# Patient Record
Sex: MALE | Race: WHITE | NOT HISPANIC OR LATINO | Employment: OTHER | URBAN - METROPOLITAN AREA
[De-identification: names, ages, dates, MRNs, and addresses within clinical notes are randomized per-mention and may not be internally consistent; named-entity substitution may affect disease eponyms.]

---

## 2020-10-09 ENCOUNTER — HOSPITAL ENCOUNTER (OUTPATIENT)
Dept: OTHER | Facility: HOSPITAL | Age: 55
Discharge: HOME | End: 2020-10-09
Attending: FAMILY MEDICINE

## 2020-10-09 ENCOUNTER — CONVERSION ENCOUNTER (OUTPATIENT)
Dept: FAMILY MEDICINE CLINIC | Age: 55
End: 2020-10-09

## 2020-10-13 LAB — SARS-COV-2 RNA SPEC QL NAA+PROBE: NOT DETECTED

## 2021-07-02 VITALS — TEMPERATURE: 98.6 F

## 2021-10-10 ENCOUNTER — HOSPITAL ENCOUNTER (INPATIENT)
Dept: HOSPITAL 49 - FER | Age: 56
LOS: 2 days | Discharge: HOME | DRG: 175 | End: 2021-10-12
Attending: INTERNAL MEDICINE | Admitting: INTERNAL MEDICINE
Payer: COMMERCIAL

## 2021-10-10 VITALS — HEIGHT: 72.01 IN | BODY MASS INDEX: 19.28 KG/M2 | WEIGHT: 142.37 LBS

## 2021-10-10 DIAGNOSIS — Z20.822: ICD-10-CM

## 2021-10-10 DIAGNOSIS — I26.99: Primary | ICD-10-CM

## 2021-10-10 DIAGNOSIS — F11.10: ICD-10-CM

## 2021-10-10 DIAGNOSIS — J96.91: ICD-10-CM

## 2021-10-10 DIAGNOSIS — Z98.890: ICD-10-CM

## 2021-10-10 DIAGNOSIS — J44.9: ICD-10-CM

## 2021-10-10 DIAGNOSIS — Z86.711: ICD-10-CM

## 2021-10-10 DIAGNOSIS — A15.0: ICD-10-CM

## 2021-10-10 DIAGNOSIS — Z87.01: ICD-10-CM

## 2021-10-10 DIAGNOSIS — F15.10: ICD-10-CM

## 2021-10-10 DIAGNOSIS — F17.210: ICD-10-CM

## 2021-10-10 LAB
ALBUMIN SERPL-MCNC: 3.7 G/DL (ref 3.4–5)
ALKALINE PHOSHATASE: 55 U/L (ref 46–116)
ALT SERPL-CCNC: 27 U/L (ref 16–63)
AMYLASE SERPL-CCNC: 41 U/L (ref 25–115)
AST: 14 U/L (ref 15–37)
BASOPHIL: 0.3 % (ref 0–2)
BILIRUBIN - TOTAL: 1.8 MG/DL (ref 0.2–1)
BILIRUBIN: NEGATIVE MG/DL
BLOOD: (no result) ERY/UL
BUN SERPL-MCNC: 9 MG/DL (ref 7–18)
BUN/CREAT RATIO (CALC): 10.7 RATIO
CHLORIDE: 101 MMOL/L (ref 98–107)
CLARITY UR: CLEAR
CO2 (BICARBONATE): 29 MMOL/L (ref 21–32)
COLOR: YELLOW
CREATININE: 0.84 MG/DL (ref 0.67–1.17)
EOSINOPHIL: 0.7 % (ref 0–5)
GLOBULIN (CALCULATION): 4.6 G/DL
GLUCOSE (U): NORMAL MG/DL
GLUCOSE SERPL-MCNC: 107 MG/DL (ref 74–106)
HCT: 45.6 % (ref 42–52)
HGB BLD-MCNC: 15.2 G/DL (ref 13.2–18)
INR PPP: 1.11 (ref 0.9–1.2)
LACTIC ACID: 1.6 MMOL/L (ref 0.4–1.9)
LEUKOCYTES: NEGATIVE LEU/UL
LIPASE: 58 U/L (ref 73–393)
LYMPHOCYTE: 21.3 % (ref 15–48)
MAGNESIUM SERPL-MCNC: 2.2 MG/DL (ref 1.8–2.4)
MCH RBC QN AUTO: 31.1 PG (ref 25–31)
MCHC RBC AUTO-ENTMCNC: 33.3 G/DL (ref 32–36)
MCV: 93.4 FL (ref 78–100)
MONOCYTE: 8.2 % (ref 0–12)
MPV: 9.4 FL (ref 6–9.5)
NEUTROPHIL: 69.2 % (ref 41–80)
NITRITE: NEGATIVE MG/DL
NRBC: 0
PLT: 219 K/UL (ref 150–400)
POTASSIUM: 4.2 MMOL/L (ref 3.5–5.1)
PRO-BNP: 60 PG/ML (ref ?–125)
PROTEIN: (no result) MG/DL
PROTHROMBIN TIME: 13.7 SECONDS (ref 11.8–13.4)
PTT: 29.6 SECONDS (ref 24.4–34.7)
RBC MORPHOLOGY: NORMAL
RBC: 4.88 M/UL (ref 4.7–6)
RDW: 13.7 % (ref 11.5–14)
SPECIFIC GRAVITY: 1.01 (ref 1–1.03)
TOTAL PROTEIN: 8.3 G/DL (ref 6.4–8.2)
URINARY RBC: (no result)
UROBILINOGEN: 0.2 MG/DL (ref 0.2–1)
WBC: 15 K/UL (ref 4–10.5)

## 2021-10-10 PROCEDURE — U0002 COVID-19 LAB TEST NON-CDC: HCPCS

## 2021-10-10 PROCEDURE — G0378 HOSPITAL OBSERVATION PER HR: HCPCS

## 2021-10-10 PROCEDURE — A4216 STERILE WATER/SALINE, 10 ML: HCPCS

## 2021-10-10 PROCEDURE — G0480 DRUG TEST DEF 1-7 CLASSES: HCPCS

## 2021-10-11 LAB
AMPHETAMINES: POSITIVE
BARBITURATES: NEGATIVE
BASOPHIL: 0.7 % (ref 0–2)
BILIRUBIN - DIRECT: 0.2 MG/DL (ref 0–0.2)
BILIRUBIN - TOTAL: 0.7 MG/DL (ref 0.2–1)
BUN SERPL-MCNC: 10 MG/DL (ref 7–18)
BUN/CREAT RATIO (CALC): 13 RATIO
CHLORIDE: 103 MMOL/L (ref 98–107)
CO2 (BICARBONATE): 26 MMOL/L (ref 21–32)
CREATININE: 0.77 MG/DL (ref 0.67–1.17)
ECSTASY (MDMA): NEGATIVE
EOSINOPHIL: 2.2 % (ref 0–5)
GLUCOSE SERPL-MCNC: 103 MG/DL (ref 74–106)
HCT: 38.1 % (ref 42–52)
HGB BLD-MCNC: 12.7 G/DL (ref 13.2–18)
LYMPHOCYTE: 24.5 % (ref 15–48)
MAGNESIUM SERPL-MCNC: 1.8 MG/DL (ref 1.8–2.4)
MARIJUANA (THC): NEGATIVE
MCH RBC QN AUTO: 31.1 PG (ref 25–31)
MCHC RBC AUTO-ENTMCNC: 33.3 G/DL (ref 32–36)
MCV: 93.2 FL (ref 78–100)
METHADONE: NEGATIVE
MONOCYTE: 10.3 % (ref 0–12)
MPV: 9.8 FL (ref 6–9.5)
NEUTROPHIL: 62 % (ref 41–80)
NRBC: 0
OPIATES: POSITIVE
OXYCODONE: NEGATIVE
PLT: 200 K/UL (ref 150–400)
POTASSIUM: 3.8 MMOL/L (ref 3.5–5.1)
RBC MORPHOLOGY: NORMAL
RBC: 4.09 M/UL (ref 4.7–6)
RDW: 13.6 % (ref 11.5–14)
WBC: 10.3 K/UL (ref 4–10.5)

## 2021-10-12 LAB
ALBUMIN SERPL-MCNC: 2.4 G/DL (ref 3.4–5)
ALKALINE PHOSHATASE: 40 U/L (ref 46–116)
ALT SERPL-CCNC: 15 U/L (ref 16–63)
AST: 9 U/L (ref 15–37)
BASOPHIL: 0.9 % (ref 0–2)
BILIRUBIN - TOTAL: 0.8 MG/DL (ref 0.2–1)
BUN SERPL-MCNC: 7 MG/DL (ref 7–18)
BUN/CREAT RATIO (CALC): 9 RATIO
CHLORIDE: 107 MMOL/L (ref 98–107)
CO2 (BICARBONATE): 27 MMOL/L (ref 21–32)
CREATININE: 0.78 MG/DL (ref 0.67–1.17)
EOSINOPHIL: 3.3 % (ref 0–5)
GLOBULIN (CALCULATION): 3.5 G/DL
GLUCOSE SERPL-MCNC: 98 MG/DL (ref 74–106)
HCT: 38.9 % (ref 42–52)
HGB BLD-MCNC: 12.9 G/DL (ref 13.2–18)
LYMPHOCYTE: 26.7 % (ref 15–48)
MCH RBC QN AUTO: 30.7 PG (ref 25–31)
MCHC RBC AUTO-ENTMCNC: 33.2 G/DL (ref 32–36)
MCV: 92.6 FL (ref 78–100)
MONOCYTE: 10.1 % (ref 0–12)
MPV: 9.5 FL (ref 6–9.5)
NEUTROPHIL: 58.6 % (ref 41–80)
NRBC: 0
PLT: 221 K/UL (ref 150–400)
POTASSIUM: 4.1 MMOL/L (ref 3.5–5.1)
RBC MORPHOLOGY: NORMAL
RBC: 4.2 M/UL (ref 4.7–6)
RDW: 13.3 % (ref 11.5–14)
TOTAL PROTEIN: 5.9 G/DL (ref 6.4–8.2)
WBC: 7.9 K/UL (ref 4–10.5)

## 2021-10-12 NOTE — NUR
10/12/21 Mr. Azul lives with his sister. He does not use DME. - [facility]
verified insurance coverage for medications without a co-pay. 02 is not needed
at discharge as evidence by walk test.

## 2022-09-06 ENCOUNTER — TELEPHONE (OUTPATIENT)
Dept: ORTHOPEDIC SURGERY | Facility: CLINIC | Age: 57
End: 2022-09-06

## 2022-09-06 NOTE — TELEPHONE ENCOUNTER
Left message to see if the patient has had a x-ray of the hip, if he has had a xray he needs to bring them on a disk. If not we will get one the day of his appointment.     We do have the MRI under media but we need an x-ray as well

## 2022-09-15 ENCOUNTER — HOSPITAL ENCOUNTER (OUTPATIENT)
Dept: GENERAL RADIOLOGY | Facility: HOSPITAL | Age: 57
Discharge: HOME | End: 2022-09-15
Admitting: NURSE PRACTITIONER

## 2022-09-15 ENCOUNTER — TRANSCRIBE ORDERS (OUTPATIENT)
Dept: ADMINISTRATIVE | Facility: HOSPITAL | Age: 57
End: 2022-09-15

## 2022-09-15 DIAGNOSIS — R05.9 COUGH: Primary | ICD-10-CM

## 2022-09-15 DIAGNOSIS — R05.9 COUGH: ICD-10-CM

## 2022-09-15 PROCEDURE — 71046 X-RAY EXAM CHEST 2 VIEWS: CPT

## 2022-09-19 ENCOUNTER — OFFICE VISIT (OUTPATIENT)
Dept: ORTHOPEDIC SURGERY | Facility: CLINIC | Age: 57
End: 2022-09-19

## 2022-09-19 ENCOUNTER — HOSPITAL ENCOUNTER (OUTPATIENT)
Dept: GENERAL RADIOLOGY | Facility: HOSPITAL | Age: 57
Discharge: HOME | End: 2022-09-19
Admitting: ORTHOPAEDIC SURGERY

## 2022-09-19 VITALS — TEMPERATURE: 97.7 F | BODY MASS INDEX: 18.16 KG/M2 | WEIGHT: 137 LBS | HEIGHT: 73 IN

## 2022-09-19 DIAGNOSIS — M25.551 RIGHT HIP PAIN: ICD-10-CM

## 2022-09-19 DIAGNOSIS — M25.551 RIGHT HIP PAIN: Primary | ICD-10-CM

## 2022-09-19 DIAGNOSIS — M16.11 PRIMARY OSTEOARTHRITIS OF RIGHT HIP: ICD-10-CM

## 2022-09-19 PROCEDURE — 99204 OFFICE O/P NEW MOD 45 MIN: CPT | Performed by: ORTHOPAEDIC SURGERY

## 2022-09-19 PROCEDURE — 73502 X-RAY EXAM HIP UNI 2-3 VIEWS: CPT

## 2022-09-19 RX ORDER — DICLOFENAC SODIUM 75 MG/1
75 TABLET, DELAYED RELEASE ORAL 2 TIMES DAILY
COMMUNITY

## 2022-09-19 RX ORDER — ATORVASTATIN CALCIUM 20 MG/1
20 TABLET, FILM COATED ORAL DAILY
COMMUNITY

## 2022-10-02 PROBLEM — M25.551 RIGHT HIP PAIN: Status: ACTIVE | Noted: 2022-10-02

## 2022-10-02 PROBLEM — M16.11 PRIMARY OSTEOARTHRITIS OF RIGHT HIP: Status: ACTIVE | Noted: 2022-10-02

## 2022-10-11 ENCOUNTER — PREP FOR SURGERY (OUTPATIENT)
Dept: OTHER | Facility: HOSPITAL | Age: 57
End: 2022-10-11

## 2022-10-11 DIAGNOSIS — M16.11 PRIMARY OSTEOARTHRITIS OF RIGHT HIP: Primary | ICD-10-CM

## 2022-10-11 RX ORDER — VANCOMYCIN HYDROCHLORIDE 1 G/200ML
15 INJECTION, SOLUTION INTRAVENOUS ONCE
Status: CANCELLED | OUTPATIENT
Start: 2022-10-11 | End: 2022-10-11

## 2022-10-11 RX ORDER — TRANEXAMIC ACID 10 MG/ML
1000 INJECTION, SOLUTION INTRAVENOUS ONCE
Status: CANCELLED | OUTPATIENT
Start: 2022-10-11 | End: 2022-10-11

## 2022-10-11 RX ORDER — PREGABALIN 75 MG/1
150 CAPSULE ORAL ONCE
Status: CANCELLED | OUTPATIENT
Start: 2022-10-11 | End: 2022-10-11

## 2022-10-11 RX ORDER — MELOXICAM 15 MG/1
15 TABLET ORAL ONCE
Status: CANCELLED | OUTPATIENT
Start: 2022-10-11 | End: 2022-10-11

## 2022-10-11 RX ORDER — POVIDONE-IODINE 10 MG/ML
1 SOLUTION TOPICAL ONCE
Status: CANCELLED | OUTPATIENT
Start: 2022-10-11 | End: 2022-10-11

## 2022-10-11 RX ORDER — CEFAZOLIN SODIUM 2 G/100ML
2 INJECTION, SOLUTION INTRAVENOUS ONCE
Status: CANCELLED | OUTPATIENT
Start: 2022-10-11 | End: 2022-10-11

## 2022-10-11 RX ORDER — ACETAMINOPHEN 500 MG
1000 TABLET ORAL ONCE
Status: CANCELLED | OUTPATIENT
Start: 2022-10-11 | End: 2022-10-11

## 2022-10-11 RX ORDER — CHLORHEXIDINE GLUCONATE 500 MG/1
CLOTH TOPICAL TAKE AS DIRECTED
Status: CANCELLED | OUTPATIENT
Start: 2022-10-11

## 2022-10-11 RX ORDER — ACETAMINOPHEN 10 MG/ML
1000 INJECTION, SOLUTION INTRAVENOUS ONCE
Status: CANCELLED | OUTPATIENT
Start: 2022-10-11 | End: 2022-10-11

## 2022-11-15 ENCOUNTER — TELEPHONE (OUTPATIENT)
Dept: ORTHOPEDIC SURGERY | Facility: CLINIC | Age: 57
End: 2022-11-15

## 2022-11-15 NOTE — TELEPHONE ENCOUNTER
LEFT MESSAGE FOR PATIENT'S SISTER MICHELLE NICOLE TO RETURN MY CALL TO SEE ABOUT PICKING OUT A SURGERY DATE FOR HER BROTHER'S RIGHT DIRECT ANTERIOR HIP REPLACEMENT./AW